# Patient Record
Sex: MALE | Race: ASIAN | Employment: UNEMPLOYED | URBAN - METROPOLITAN AREA
[De-identification: names, ages, dates, MRNs, and addresses within clinical notes are randomized per-mention and may not be internally consistent; named-entity substitution may affect disease eponyms.]

---

## 2024-06-05 ENCOUNTER — HOSPITAL ENCOUNTER (EMERGENCY)
Age: 34
Discharge: HOME | End: 2024-06-05
Attending: STUDENT IN AN ORGANIZED HEALTH CARE EDUCATION/TRAINING PROGRAM

## 2024-06-05 VITALS
HEIGHT: 74 IN | SYSTOLIC BLOOD PRESSURE: 116 MMHG | TEMPERATURE: 98.2 F | BODY MASS INDEX: 21.92 KG/M2 | OXYGEN SATURATION: 97 % | RESPIRATION RATE: 18 BRPM | HEART RATE: 92 BPM | DIASTOLIC BLOOD PRESSURE: 76 MMHG | WEIGHT: 170.8 LBS

## 2024-06-05 DIAGNOSIS — S61.412A LACERATION OF LEFT HAND, FOREIGN BODY PRESENCE UNSPECIFIED, INITIAL ENCOUNTER: Primary | ICD-10-CM

## 2024-06-05 PROCEDURE — 99282 EMERGENCY DEPT VISIT SF MDM: CPT

## 2024-06-05 PROCEDURE — 12001 RPR S/N/AX/GEN/TRNK 2.5CM/<: CPT

## 2024-06-05 NOTE — ED PROVIDER NOTES
Independent [facility] Visit.       Department of Emergency Medicine  ED Provider Note  Admit Date: 6/5/2024  Room: 10/10            HPI:  6/5/24, Time: 10:05 AM EDT  .       Juan Jaime is a 34 y.o. old male presenting to the emergency department by private car for the evaluation of a two-hour history of a left hand laceration. An  was used to obtain information for the HPI and for the duration of the entire visit to communicate with the patient as he is [place] speaking. He states he was moving furniture between 0815 and 0900 this morning when he hit the dorsal aspect of his hand off of the corner of a book shelf. He denies pain with movement of the hand. He states his last tetanus was 5-6 years ago.     Review of Systems:   Pertinent positives and negatives are stated within HPI, all other systems reviewed and are negative.          --------------------------------------------- PAST HISTORY ---------------------------------------------  Past Medical History:  has a past medical history of Ulcerative colitis (HCC).    Past Surgical History:  has no past surgical history on file.    Social History:      Family History: family history is not on file.     The patient’s home medications have been reviewed.    Allergies: Lactose intolerance (gi)    -------------------------------------------------- RESULTS -------------------------------------------------  All laboratory and radiology results have been personally reviewed by myself   LABS:  No results found for this visit on 06/05/24.    RADIOLOGY:  Interpreted by Radiologist.  No orders to display       ------------------------- NURSING NOTES AND VITALS REVIEWED ---------------------------   The nursing notes within the ED encounter and vital signs as below have been reviewed.   /76   Pulse 92   Temp 98.2 °F (36.8 °C) (Oral)   Resp 18   Ht 1.88 m (6' 2\")   Wt 77.5 kg (170 lb 12.8 oz)   SpO2 97%   BMI 21.93 kg/m²   Oxygen Saturation Interpretation:

## 2024-06-05 NOTE — DISCHARGE INSTRUCTIONS
Leave the dressing in place today, you may remove it tomorrow, wash the area gently with mild soap and water, pat dry, apply bacitracin and a dressing.  Change the dressing once daily.  Please follow-up in the family medicine clinic in 10 to 14 days to have the sutures removed.  You may leave the area open to air after the next 3 to 4 days.  If you develop any increased redness, swelling, drainage please return to the ER.

## 2024-06-05 NOTE — ED NOTES
Pt instructions given in oral and written forms in pt own language using  iPad and written instructions

## 2025-05-05 NOTE — ED NOTES
DERMATOPATHOLOGY RESULT NOTE    Results reviewed by ordering physician.  Reviewed. Sent [facility] message.      Instructions for Clinical Derm Team:   (remember to route Result Note to appropriate staff):    None    Result & Plan by Specimen:    Specimen A: benign  Plan: margins clear Sutures placed by CNP.   Hand washed and suture line cleansed with wound cleanser.  Bacitracin to suture line then DSD and ace wrap.